# Patient Record
Sex: FEMALE | Race: WHITE
[De-identification: names, ages, dates, MRNs, and addresses within clinical notes are randomized per-mention and may not be internally consistent; named-entity substitution may affect disease eponyms.]

---

## 2017-05-08 ENCOUNTER — HOSPITAL ENCOUNTER (OUTPATIENT)
Dept: HOSPITAL 35 - PAIN | Age: 67
End: 2017-05-08
Attending: ANESTHESIOLOGY
Payer: COMMERCIAL

## 2017-05-08 VITALS — BODY MASS INDEX: 49.96 KG/M2 | HEIGHT: 59.02 IN | WEIGHT: 247.8 LBS

## 2017-05-08 VITALS — DIASTOLIC BLOOD PRESSURE: 60 MMHG | SYSTOLIC BLOOD PRESSURE: 149 MMHG

## 2017-05-08 DIAGNOSIS — I10: ICD-10-CM

## 2017-05-08 DIAGNOSIS — F41.8: ICD-10-CM

## 2017-05-08 DIAGNOSIS — G47.30: ICD-10-CM

## 2017-05-08 DIAGNOSIS — M17.0: Primary | ICD-10-CM

## 2017-05-08 DIAGNOSIS — E66.01: ICD-10-CM

## 2017-05-08 DIAGNOSIS — G89.4: ICD-10-CM

## 2017-05-08 DIAGNOSIS — Z96.652: ICD-10-CM

## 2018-01-29 ENCOUNTER — HOSPITAL ENCOUNTER (OUTPATIENT)
Dept: HOSPITAL 35 - PAIN | Age: 68
End: 2018-01-29
Attending: ANESTHESIOLOGY
Payer: COMMERCIAL

## 2018-01-29 VITALS — DIASTOLIC BLOOD PRESSURE: 63 MMHG | SYSTOLIC BLOOD PRESSURE: 166 MMHG

## 2018-01-29 DIAGNOSIS — G90.522: ICD-10-CM

## 2018-01-29 DIAGNOSIS — E66.01: ICD-10-CM

## 2018-01-29 DIAGNOSIS — F32.9: ICD-10-CM

## 2018-01-29 DIAGNOSIS — Z79.899: ICD-10-CM

## 2018-01-29 DIAGNOSIS — F41.9: ICD-10-CM

## 2018-01-29 DIAGNOSIS — M17.11: Primary | ICD-10-CM

## 2018-04-05 ENCOUNTER — HOSPITAL ENCOUNTER (OUTPATIENT)
Dept: HOSPITAL 35 - PAIN | Age: 68
End: 2018-04-05
Attending: ANESTHESIOLOGY
Payer: COMMERCIAL

## 2018-04-05 VITALS — SYSTOLIC BLOOD PRESSURE: 143 MMHG | DIASTOLIC BLOOD PRESSURE: 67 MMHG

## 2018-04-05 VITALS — WEIGHT: 245 LBS | HEIGHT: 60 IN | BODY MASS INDEX: 48.1 KG/M2

## 2018-04-05 DIAGNOSIS — F32.9: ICD-10-CM

## 2018-04-05 DIAGNOSIS — G90.522: ICD-10-CM

## 2018-04-05 DIAGNOSIS — E66.01: ICD-10-CM

## 2018-04-05 DIAGNOSIS — M17.11: Primary | ICD-10-CM

## 2018-04-05 DIAGNOSIS — Z79.899: ICD-10-CM

## 2018-04-05 DIAGNOSIS — F41.9: ICD-10-CM

## 2018-08-20 ENCOUNTER — HOSPITAL ENCOUNTER (OUTPATIENT)
Dept: HOSPITAL 35 - PAIN | Age: 68
End: 2018-08-20
Attending: ANESTHESIOLOGY
Payer: COMMERCIAL

## 2018-08-20 VITALS — SYSTOLIC BLOOD PRESSURE: 149 MMHG | DIASTOLIC BLOOD PRESSURE: 65 MMHG

## 2018-08-20 VITALS — BODY MASS INDEX: 45 KG/M2 | WEIGHT: 229.2 LBS | HEIGHT: 60 IN

## 2018-08-20 DIAGNOSIS — M17.11: ICD-10-CM

## 2018-08-20 DIAGNOSIS — F32.9: ICD-10-CM

## 2018-08-20 DIAGNOSIS — G90.522: Primary | ICD-10-CM

## 2018-08-20 DIAGNOSIS — F41.9: ICD-10-CM

## 2018-08-20 DIAGNOSIS — Z79.899: ICD-10-CM

## 2018-08-20 DIAGNOSIS — E66.01: ICD-10-CM

## 2018-11-15 ENCOUNTER — HOSPITAL ENCOUNTER (OUTPATIENT)
Dept: HOSPITAL 35 - PAIN | Age: 68
End: 2018-11-15
Attending: CLINICAL NURSE SPECIALIST
Payer: COMMERCIAL

## 2018-11-15 VITALS — SYSTOLIC BLOOD PRESSURE: 150 MMHG | DIASTOLIC BLOOD PRESSURE: 71 MMHG

## 2018-11-15 VITALS — WEIGHT: 226 LBS | HEIGHT: 60 IN | BODY MASS INDEX: 44.37 KG/M2

## 2018-11-15 DIAGNOSIS — M25.552: ICD-10-CM

## 2018-11-15 DIAGNOSIS — M25.562: Primary | ICD-10-CM

## 2018-11-15 DIAGNOSIS — M54.5: ICD-10-CM

## 2018-11-15 DIAGNOSIS — M25.551: ICD-10-CM

## 2018-11-15 DIAGNOSIS — M79.652: ICD-10-CM

## 2018-12-07 ENCOUNTER — HOSPITAL ENCOUNTER (EMERGENCY)
Dept: HOSPITAL 35 - ER | Age: 68
Discharge: HOME | End: 2018-12-07
Payer: COMMERCIAL

## 2018-12-07 VITALS — BODY MASS INDEX: 47.12 KG/M2 | WEIGHT: 240 LBS | HEIGHT: 60 IN

## 2018-12-07 VITALS — SYSTOLIC BLOOD PRESSURE: 174 MMHG | DIASTOLIC BLOOD PRESSURE: 76 MMHG

## 2018-12-07 DIAGNOSIS — Z88.4: ICD-10-CM

## 2018-12-07 DIAGNOSIS — G89.29: ICD-10-CM

## 2018-12-07 DIAGNOSIS — E78.00: ICD-10-CM

## 2018-12-07 DIAGNOSIS — Z88.5: ICD-10-CM

## 2018-12-07 DIAGNOSIS — E11.9: ICD-10-CM

## 2018-12-07 DIAGNOSIS — I10: ICD-10-CM

## 2018-12-07 DIAGNOSIS — F32.9: ICD-10-CM

## 2018-12-07 DIAGNOSIS — M19.90: ICD-10-CM

## 2018-12-07 DIAGNOSIS — Z96.653: ICD-10-CM

## 2018-12-07 DIAGNOSIS — M79.605: Primary | ICD-10-CM

## 2019-02-27 NOTE — NUR
4Pain Clinic Assessment:
 
1. History of Osteoarthritis:
yes
   History of Rheumatoid Arthritis:
Not Applicable
 
2. Height:  ft.  in.  cm.
   Weight:  lb.  oz.  kg.
   Patient's BMI:
 
3. Vital Signs:
   BP:  Pulse:  Resp:
   Temp:  02 Sat:  ECG Mon:
 
4. Pain Intensity: 8
 
5. Fall Risk:
   Dizziness:   Needs help standing or walking:
   Fallen in the last 3 months:
   Fall risk comments:
 
 
6. Patient on Blood Thinner: None
 
7. History of Hypertension: Y
 
8. Opioid Therapy greater than 6 weeks: Y
   Opiate Contract Signed:
 
9. Risk Assessment Tool Provided: LOW
 
10. Functional Assessment Tool: 57/70
 
11. Recreational Drug Use: Never Drug Type:
    Tobacco Use: Never Smoker Tobacco Type:
       Amount or Packs/day:  How Many Years:
    Alcohol Use: No  Frequency:  Quant:

## 2019-02-28 ENCOUNTER — HOSPITAL ENCOUNTER (OUTPATIENT)
Dept: HOSPITAL 35 - PAIN | Age: 69
End: 2019-02-28
Attending: CLINICAL NURSE SPECIALIST
Payer: COMMERCIAL

## 2019-02-28 VITALS — DIASTOLIC BLOOD PRESSURE: 57 MMHG | SYSTOLIC BLOOD PRESSURE: 153 MMHG

## 2019-02-28 VITALS — WEIGHT: 254.8 LBS | HEIGHT: 60 IN | BODY MASS INDEX: 50.03 KG/M2

## 2019-02-28 DIAGNOSIS — F32.9: ICD-10-CM

## 2019-02-28 DIAGNOSIS — Z79.899: ICD-10-CM

## 2019-02-28 DIAGNOSIS — G90.522: ICD-10-CM

## 2019-02-28 DIAGNOSIS — E66.01: ICD-10-CM

## 2019-02-28 DIAGNOSIS — Z79.891: ICD-10-CM

## 2019-02-28 DIAGNOSIS — M17.0: Primary | ICD-10-CM

## 2019-02-28 DIAGNOSIS — F41.9: ICD-10-CM

## 2019-02-28 NOTE — NUR
Pain Clinic Assessment:
 
1. History of Osteoarthritis:
yes
   History of Rheumatoid Arthritis:
Not Applicable
 
2. Height: 5 ft. 0 in. 152.4 cm.
   Weight: 254.8 lb.  oz. 115.577 kg.
   Patient's BMI: 49.8
 
3. Vital Signs:
   BP: 153/57 Pulse: 60 Resp: 16
   Temp:  02 Sat: 97 ECG Mon:
 
4. Pain Intensity: 8
 
5. Fall Risk:
   Dizziness: N  Needs help standing or walking: Y
   Fallen in the last 3 months: N
   Fall risk comments:
 
 
6. Patient on Blood Thinner: None
 
7. History of Hypertension: Y
 
8. Opioid Therapy greater than 6 weeks: Y
   Opiate Contract Signed:
 
9. Risk Assessment Tool Provided: LOW
 
10. Functional Assessment Tool: 57/70
 
11. Recreational Drug Use: Never Drug Type:
    Tobacco Use: Never Smoker Tobacco Type:
       Amount or Packs/day:  How Many Years:
    Alcohol Use: No  Frequency:  Quant:

## 2019-06-13 ENCOUNTER — HOSPITAL ENCOUNTER (OUTPATIENT)
Dept: HOSPITAL 35 - PAIN | Age: 69
End: 2019-06-13
Attending: CLINICAL NURSE SPECIALIST
Payer: COMMERCIAL

## 2019-06-13 VITALS — DIASTOLIC BLOOD PRESSURE: 56 MMHG | SYSTOLIC BLOOD PRESSURE: 129 MMHG

## 2019-06-13 VITALS — WEIGHT: 250 LBS | BODY MASS INDEX: 49.08 KG/M2 | HEIGHT: 60 IN

## 2019-06-13 DIAGNOSIS — E66.01: ICD-10-CM

## 2019-06-13 DIAGNOSIS — F41.9: ICD-10-CM

## 2019-06-13 DIAGNOSIS — F32.9: ICD-10-CM

## 2019-06-13 DIAGNOSIS — M17.0: Primary | ICD-10-CM

## 2019-06-13 NOTE — NUR
Pain Clinic Assessment:
 
1. History of Osteoarthritis:
yes
   History of Rheumatoid Arthritis:
Not Applicable
 
2. Height: 5 ft. 0 in. 152.4 cm.
   Weight: 250.0 lb.  oz. 113.400 kg.
   Patient's BMI: 48.8
 
3. Vital Signs:
   BP: 129/56 Pulse: 58 Resp: 16
   Temp:  02 Sat: 99 ECG Mon:
 
4. Pain Intensity: 8 with walking
 
5. Fall Risk:
   Dizziness: N  Needs help standing or walking: Y
   Fallen in the last 3 months: N
   Fall risk comments:
 
 
6. Patient on Blood Thinner: None
 
7. History of Hypertension: Y
 
8. Opioid Therapy greater than 6 weeks: Y
   Opiate Contract Signed:
 
9. Risk Assessment Tool Provided: LOW
 
10. Functional Assessment Tool: 57/70
 
11. Recreational Drug Use: Never Drug Type:
    Tobacco Use: Never Smoker Tobacco Type:
       Amount or Packs/day:  How Many Years:
    Alcohol Use: No  Frequency:  Quant:

## 2019-08-15 ENCOUNTER — HOSPITAL ENCOUNTER (OUTPATIENT)
Dept: HOSPITAL 35 - PAIN | Age: 69
End: 2019-08-15
Attending: ANESTHESIOLOGY
Payer: COMMERCIAL

## 2019-08-15 VITALS — DIASTOLIC BLOOD PRESSURE: 66 MMHG | SYSTOLIC BLOOD PRESSURE: 141 MMHG

## 2019-08-15 VITALS — HEIGHT: 60 IN | WEIGHT: 247 LBS | BODY MASS INDEX: 48.49 KG/M2

## 2019-08-15 DIAGNOSIS — E66.01: ICD-10-CM

## 2019-08-15 DIAGNOSIS — F41.9: ICD-10-CM

## 2019-08-15 DIAGNOSIS — G90.522: ICD-10-CM

## 2019-08-15 DIAGNOSIS — Z79.891: ICD-10-CM

## 2019-08-15 DIAGNOSIS — F32.9: ICD-10-CM

## 2019-08-15 DIAGNOSIS — M17.12: Primary | ICD-10-CM

## 2019-08-15 NOTE — NUR
Pain Clinic Assessment:
 
1. History of Osteoarthritis:
yes
   History of Rheumatoid Arthritis:
Not Applicable
 
2. Height: 5 ft. 0 in. 152.4 cm.
   Weight: 247.0 lb.  oz. 112.039 kg.
   Patient's BMI: 48.2
 
3. Vital Signs:
   BP: 141/66 Pulse: 57 Resp: 16
   Temp:  02 Sat: 97 ECG Mon:
 
4. Pain Intensity: 8
 
5. Fall Risk:
   Dizziness: N  Needs help standing or walking: Y
   Fallen in the last 3 months: N
   Fall risk comments:
 
 
6. Patient on Blood Thinner: None
 
7. History of Hypertension: Y
 
8. Opioid Therapy greater than 6 weeks: Y
   Opiate Contract Signed:
 
9. Risk Assessment Tool Provided: LOW
 
10. Functional Assessment Tool: 57/70
 
11. Recreational Drug Use: Never Drug Type:
    Tobacco Use: Never Smoker Tobacco Type:
       Amount or Packs/day:  How Many Years:
    Alcohol Use: No  Frequency:  Quant:

## 2019-08-15 NOTE — HPC
[facility]
[address]
[place]   [address]                     PAIN MANAGEMENT CONSULTATION  
_______________________________________________________________________________
 
Name:       YUNIER TAVAREZ             Room #:                     REG ABIGAIL 
GERARDO.#:      [id number]                       Account #:      [id number]  
Admission:  08/15/19    Attend Phys:    Vineet Shipley MD 
Discharge:              Date of Birth:  [birth year]  
                                                          Report #: [id number]
                                                                    [id number]   
_______________________________________________________________________________
THIS REPORT FOR:   //name//                          
 
CC: Shantell Shipley
 
DATE OF SERVICE:  08/15/2019
 
 
Followup visit for chronic left knee pain.
 
The patient returns to pain clinic for followup for medication management.  I
provide her with Butrans patches.  She also received Celebrex which has been
somewhat helpful in managing her pain.
 
We had some discussion today about cooled radiofrequency.  I think that she
would be a difficult case due to the significant structural abnormalities that
exist within her knee joint.
 
PQRS REVIEW:
1.  History of osteoarthritis, diffuse, particularly involving the knees.
2.  BMI of 48.2.  Counseling regarding weight loss.
3.  Blood pressure 141/66, heart rate 57, respirations 16.
4.  Pain intensity 8/10.
5.  She is a fall risk but has not fallen in the last 3 months.  She needs help
standing and walking.
6.  She is on no blood thinners.
7.  History of hypertension, which is currently under treatment.  All of her
medications were reviewed and reconciled from the electronic medical record and
are noted there.
8.  She is on an opioid agreement and we reviewed the important aspects of that.
 She is grateful for the pain relief that her pain medications provide and
improvement in her day-to-day activities.  She denies significant side effects. 
She carefully safeguards all medication.
9.  She is at low risk for addiction as scored by the opioid risk tool.
10.  Functional assessment score is 57 suggesting high impact of her pain.
11.  She denies use of tobacco and alcohol.
 
PHYSICAL EXAMINATION:
VITAL SIGNS:  As noted.
GENERAL:  She is pleasant, alert and oriented, without signs of depression or
anxiety.
CHEST:  Examination of the chest is clear.
CARDIAC:  Rhythm is regular.
EXTREMITIES:  Examination of the left leg reveals edematous lower extremity. 
Midline scar over the knee.  Localized tenderness and significant restrictions
in range of motion in flexion.  There is tenderness along the lateral thigh and
 
 
 
[facility]
[address]
[place]   [address]                     PAIN MANAGEMENT CONSULTATION  
_______________________________________________________________________________
 
Name:       YUNIER TAVAREZ             Room #:                     REG [facility].#:      [id number]                       Account #:      [id number]  
Admission:  08/15/19    Attend Phys:    Vineet Shipley MD 
Discharge:              Date of Birth:  [birth year]  
                                                          Report #: [id number]
                                                                    [id number]   
_______________________________________________________________________________
also down into the calf.  There is some discoloration and bruising.
 
IMPRESSION:
1.  Chronic intractable pain.
2.  Severe osteoarthritis.
3.  Complex regional pain syndrome, left lower extremity.
4.  Depression and anxiety, improved.
5.  Morbid obesity.
6.  Management of opioid medications under terms of written opioid agreement.
 
I renewed her Butrans 10 mg and Celebrex 200 mg for the next 3 months.
 
Follow up as needed.
 
 
 
 
 
 
 
 
 
 
 
 
 
 
 
 
 
 
 
 
 
 
 
 
 
 
 
 
 
 
 
                         
   By:                               
                   
D: 08/15/19 1741                           _____________________________________
T: 08/16/19 0236                           MD eusebia Hill

## 2019-12-16 ENCOUNTER — HOSPITAL ENCOUNTER (OUTPATIENT)
Dept: HOSPITAL 35 - PAIN | Age: 69
End: 2019-12-16
Attending: CLINICAL NURSE SPECIALIST
Payer: COMMERCIAL

## 2019-12-16 VITALS — BODY MASS INDEX: 48.1 KG/M2 | WEIGHT: 245 LBS | HEIGHT: 60 IN

## 2019-12-16 VITALS — DIASTOLIC BLOOD PRESSURE: 47 MMHG | SYSTOLIC BLOOD PRESSURE: 130 MMHG

## 2019-12-16 DIAGNOSIS — G89.4: Primary | ICD-10-CM

## 2019-12-16 DIAGNOSIS — E66.09: ICD-10-CM

## 2019-12-16 DIAGNOSIS — M19.90: ICD-10-CM

## 2019-12-16 DIAGNOSIS — Z79.891: ICD-10-CM

## 2019-12-16 NOTE — NUR
Pain Clinic Assessment:
 
1. History of Osteoarthritis:
yes
   History of Rheumatoid Arthritis:
Not Applicable
 
2. Height: 5 ft. 0 in. 152.4 cm.
   Weight: 245.0 lb.  oz. 111.132 kg.
   Patient's BMI: 47.8
 
3. Vital Signs:
   BP: 130/47 Pulse: 71 Resp: 20
   Temp:  02 Sat: 95 ECG Mon:
 
4. Pain Intensity: 7-8
 
5. Fall Risk:
   Dizziness: N  Needs help standing or walking: N
   Fallen in the last 3 months: N
   Fall risk comments:
 
 
6. Patient on Blood Thinner: None
 
7. History of Hypertension: Y
 
8. Opioid Therapy greater than 6 weeks: Y
   Opiate Contract Signed:
 
9. Risk Assessment Tool Provided: LOW
 
10. Functional Assessment Tool: 57/70
 
11. Recreational Drug Use: Never Drug Type:
    Tobacco Use: Never Smoker Tobacco Type:
       Amount or Packs/day:  How Many Years:
    Alcohol Use: No  Frequency:  Quant:

## 2019-12-17 NOTE — HPC
[facility]
[place]   [address]                     PAIN MANAGEMENT CONSULTATION  
_______________________________________________________________________________
 
Name:       YUNIER TAVAREZ             [facility]                     [facility]      [id number]                       Account #:      [id number]  
Admission:  12/16/19    Attend Phys:    Debbi Chaney     
Discharge:              Date of Birth:  [birth year]  
                                                          Report #: [id number]
                                                                    [id number]   
_______________________________________________________________________________
THIS REPORT FOR:   //name//                          
 
CC: Shantell Shipley MD
 
DATE OF SERVICE:  12/16/2019
 
 
CHIEF COMPLAINT:  Followup visit.  Chronic left knee pain.
 
HISTORY OF PRESENT ILLNESS:  This is a 69-year-old female who returns to the
pain clinic today for medication refill that she takes for her ongoing left knee
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  She also complains of some ongoing low
back pain, but her biggest complaint today is right leg pain.  She reports that
she has had cellulitis in this leg for about 6 weeks.  Her pain is an aching,
throbbing soreness, rating her pain at 7-8.  She reports that her pain started
in her right foot with a sore, rubbing from a new shoe and then her pain did
travel up to her knee.  She has been on antibiotics for about 6 weeks.  She is
scheduled to see the Infectious Disease doctor on 12/26/2019 because her right
leg cellulitis is not improving.
 
The patient reports her pain score is 7-8 today, worse with walking and any
activity as well as standing.  She feels that she repositions herself and uses
her medication.  Her left knee pain is significantly better, but nothing is
helping her right leg.  She also reports that she has recently had a sinus
infection and has an ongoing cough.  She is seeing her primary care doctor for
these issues.
 
ALLERGIES:  MORPHINE, CODEINE, FENTANYL, AND PROCAINE.
 
CURRENT LIST OF MEDICATIONS:  Augmentin, Celebrex, Butrans 10 mcg patch,
Lexapro, buspirone, vitamin D, Senna, metformin, minocycline, glyburide,
multivitamin, Plendil, Zocor, Benicar and omeprazole.
 
PQRS:
1.  She has diffuse osteoarthritis involving both her bilateral knees.  She
denies any rheumatoid arthritis.
2.  Height is 5 feet, weight is 245, BMI is 47.
3.  Vital signs 130/47, pulse is 71, respirations 20, oxygen sat is 95.
4.  Pain score is 7-8.
5.  Denies dizziness.  Does need help walking.  She is in a wheelchair presently
today.  She has not fallen in the last 3 months.
6.  The patient is not on any blood thinners, but does take medicine for
hypertension.  Her opioid therapy is greater than 6 weeks; therefore, an opioid
 
 
 
[address]   [address]                     PAIN MANAGEMENT CONSULTATION  
_______________________________________________________________________________
 
Name:       YUNIER TAVAREZ             Room #:                     REG CL 
M.DARCY.#:      [id number]                       Account #:      [id number]  
Admission:  12/16/19    Attend Phys:    Debbi Chaney     
Discharge:              Date of Birth:  [birth year]  
                                                          Report #: [id number]
                                                                    [id number]   
_______________________________________________________________________________
signed contract is on the chart.  Risk assessment tool is low.  Functional
assessment is 57/70.
7.  Recreational drug use, she denies.  She is not a smoker and does not drink
alcohol.
 
According to the prescription monitoring system, the patient is filling
appropriately for her Butrans patch at a mail-off pharmacy.  She does safeguard
these meds at all times per her report.
 
PHYSICAL EXAMINATION:
GENERAL:  This is alert and orientated 69-year-old female who appears slightly
older than her stated age.  She is without signs of depression or anxiety.
HEENT:  Normocephalic, atraumatic.  Extraocular eye muscles are intact.  She is
congested today.
LUNGS:  Diminished to auscultation.  She has a productive cough.
EXTREMITIES:  Examination of the left leg reveals edematous lower extremities
with scarring over the midline of her knee.  Her right leg from the knee
downward is reddened and tender to the touch and does appear edematous as well. 
The patient has increased pain with flexion and extension of both knees and
increased pain with standing.
 
IMPRESSION:
1.  Chronic intractable pain.
2.  Severe osteoarthritis.
3.  Morbid obesity.
4.  Recent diagnosis of cellulitis.
5.  Complex regional pain syndrome, left lower extremity.
6.  Management of opioid medications under terms of written opioid agreement.
 
PLAN:
1.  We discussed treatment options with the patient today.  The patient finds
the medication helpful in reducing some of her left knee pain with minimal side
effects such as constipation or daytime sleepiness.  She would like a refill of
her Butrans today.  We will fax off Butrans 10 mcg patch to the [facility] where
she fills her prescriptions.
2.  I encouraged the patient to call the Infectious Disease doctor to see if
they have any cancellations so she may try to get in sooner for an appointment
than 12/26/2019.  If they do not, I would also encourage her to call her primary
care doctor since her cellulitis is not improving despite 3 rounds of antibiotic
plus her primary care doctor can treat her ongoing sinus infection and cough. 
The patient verbalized understanding.  She will call them after discharge.
 
 
 
[address]   [address]                     PAIN MANAGEMENT CONSULTATION  
_______________________________________________________________________________
 
Name:       YUNIER TAVAREZ             Room #:                     REG CLI 
FREDDYSherlynLILIBETH#:      [id number]                       Account #:      [id number]  
Admission:  12/16/19    Attend Phys:    Debbi Chaney     
Discharge:              Date of Birth:  [birth year]  
                                                          Report #: [id number]
                                                                    [id number]   
_______________________________________________________________________________
3.  The patient will follow up in 3 months.  The patient is seen in
collaboration with Dr. Vineet Shipley today.
 
 
 
 
 
 
 
 
 
 
 
 
 
 
 
 
 
 
 
 
 
 
 
 
 
 
 
 
 
 
 
 
 
 
 
 
 
 
 
 
 
 
  <ELECTRONICALLY SIGNED>
   By: Debbi Chaney             
  12/17/19     0806
D: 12/16/19 1336                           _____________________________________
T: 12/17/19 0011                           Debbi Chaney               /nt

## 2020-03-19 ENCOUNTER — HOSPITAL ENCOUNTER (OUTPATIENT)
Dept: HOSPITAL 35 - PAIN | Age: 70
End: 2020-03-19
Attending: CLINICAL NURSE SPECIALIST
Payer: COMMERCIAL

## 2020-03-19 VITALS — BODY MASS INDEX: 47.94 KG/M2 | HEIGHT: 60 IN | WEIGHT: 244.2 LBS

## 2020-03-19 VITALS — SYSTOLIC BLOOD PRESSURE: 163 MMHG | DIASTOLIC BLOOD PRESSURE: 63 MMHG

## 2020-03-19 DIAGNOSIS — M17.12: Primary | ICD-10-CM

## 2020-03-19 DIAGNOSIS — G89.29: ICD-10-CM

## 2020-03-19 DIAGNOSIS — Z79.899: ICD-10-CM

## 2020-03-19 DIAGNOSIS — Z79.891: ICD-10-CM

## 2020-03-19 NOTE — NUR
Pain Clinic Assessment:
 
1. History of Osteoarthritis:
yes
   History of Rheumatoid Arthritis:
Not Applicable
 
2. Height: 5 ft. 0 in. 152.4 cm.
   Weight: 244.2 lb.  oz. 110.769 kg.
   Patient's BMI: 47.7
 
3. Vital Signs:
   BP: 163/63 Pulse: 63 Resp: 18
   Temp:  02 Sat: 98 ECG Mon:
 
4. Pain Intensity: 7-8
 
5. Fall Risk:
   Dizziness: Y  Needs help standing or walking: Y
   Fallen in the last 3 months: N
   Fall risk comments:
 
 
6. Patient on Blood Thinner: None
 
7. History of Hypertension: Y
 
8. Opioid Therapy greater than 6 weeks: Y
   Opiate Contract Signed:
 
9. Risk Assessment Tool Provided: LOW
 
10. Functional Assessment Tool: 57/70
 
11. Recreational Drug Use: Never Drug Type:
    Tobacco Use: Never Smoker Tobacco Type:
       Amount or Packs/day:  How Many Years:
    Alcohol Use: No  Frequency:  Quant:

## 2020-03-20 NOTE — HPC
[facility]
[address]
[place]   [address]                     PAIN MANAGEMENT CONSULTATION  
_______________________________________________________________________________
 
Name:       YUNIER TAVAREZ             Room #:                     REG [facility].#:      [id number]                       Account #:      [id number]  
Admission:  03/19/20    Attend Phys:    Debbi Chaney     
Discharge:              Date of Birth:  [birth year]  
                                                          Report #: [id number]
                                                                    [id number]   
_______________________________________________________________________________
THIS REPORT FOR:  
 
cc:  Shantell Leger MD, Aimee B. MD Hocker,Debbi ROBERT                                            ~
 
DATE OF SERVICE:  03/19/2020
 
 
CHIEF COMPLAINT:  Chronic left knee pain, low back pain.
 
HISTORY OF PRESENT ILLNESS:  This is a 70-year-old female who returns to the
pain clinic today for refill of her medications that she uses to help her
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  She also does have some
low back pain.  Today, she is reporting her pain score is 7-8.  It is an aching,
throbbing soreness that is worse with any activity, walking and weather changes.
 She feels that repositioning is beneficial as well as her Butrans patch.  She
denies any problems with constipation or daytime sleepiness.
 
Today, the patient is also reporting that she is having some lower leg pain,
some swelling in her right extremity.  She always has edema present in her left.
 She states she did see an Infectious Disease doctor and was diagnosed with
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
 
ALLERGIES:  MORPHINE, CODEINE, FENTANYL, AND PROCAINE.
 
CURRENT LIST OF MEDICATIONS:  Doxycycline, Prozac, Butrans 10 mcg patch,
Augmentin, Celebrex, buspirone, vitamin D, senna, metformin, glyburide, Extra
Strength Tylenol, multivitamin, Plendil, Simvastatin, Benicar and omeprazole.
 
PATIENT'S PQRS:
1.  She has diffuse osteoarthritis in her lower extremities.  Denies any
rheumatoid arthritis.
2.  Height is 5 feet, weight is 244, BMI is 47.
3.  Vital signs 163/63, pulse is 63, respirations 18, oxygen sat is 98.
4.  Pain score is 7-8.
5.  Complains of dizziness, does need assistance walking.  She is mostly in a
wheelchair, has not fallen in the last 3 months.
6.  The patient is not on any blood thinners, but does take medicine for
hypertension.
7.  Opioid therapy is greater than 6 weeks; therefore, an opioid signed contract
is on the chart.  Risk assessment tool is low.  Functional assessment is 57/70.
8.  Recreational drug use, she denies.  She is not a smoker and does not drink
alcohol.
 
According to the prescription monitoring system, she obtains her medicine from a
 
 
 
[address]   [address]                     PAIN MANAGEMENT CONSULTATION  
_______________________________________________________________________________
 
Name:       YUNIER TAVAREZ             Room #:                     REG [facility] 
ECTOR#:      [id number]                       Account #:      [id number]  
Admission:  03/19/20    Attend Phys:    Debbi Chaney     
Discharge:              Date of Birth:  [birth year]  
                                                          Report #: [id number]
                                                                    [id number]   
_______________________________________________________________________________
mail off pharmacy every 3 months, filling them appropriately.  She is well under
the 50 MME guidelines.  She does safeguard her meds at all times.
 
PHYSICAL EXAMINATION:
GENERAL:  This is alert and orientated, morbidly obese 70-year-old female who
appears her stated age without signs of depression or anxiety or overmedication.
HEENT:  Normocephalic, atraumatic.  Extraocular eye muscles are intact.  Mucous
membranes are moist.
LUNGS:  Diminished.
EXTREMITIES:  Her left leg reveals edematous lower extremity with scarring over
her left knee, edema at 3+.  Right leg is tender to the touch with 1+ edema,
slightly reddened.  Pain is increased with standing and weightbearing of her
left knee.
 
IMPRESSION:
1.  Chronic intractable pain.
2.  Severe osteoarthritis.
3.  Morbid obesity.
4.  Cellulitis.
5.  Complex regional pain syndrome of the left lower extremity.
6.  Management of opioid medications under written opioid agreement.
 
We reviewed the fact that opiate medications are being used to provide analgesia
adequate to support activities of daily living, not attempting to achieve a
specific pain score on the 0-10 Visual Analog Scale.  The current opiate
medications are providing sufficient analgesia to allow the patient to
participate in activities of daily living.  The patient is not exhibiting any
aberrant behavior suggestive of drug diversion.  The patient is not having any
adverse reactions to medications.  The patient is not suffering from daytime
somnolence or mental acuity changes.  The patient is managing opiate-induced
constipation with appropriate over-the-counter agents and dietary
considerations.  The patient was counseled on concern for caution with operating
a motor vehicle while using opiate medications.
 
PLAN:
1.  We discussed treatment options with the patient today.  The patient reports
that she is having some increased swelling in her right lower extremity.  Upon
examination, it does look like cellulitis.  The patient had seen an Infectious
Disease doctor in the past.  She had a diagnosis of cellulitis.  I encouraged
her to keep her leg elevated above the heart to decrease some of her edema and
if symptoms continue to worsen to contact her Infectious Disease doctor.
2.  The patient does report having some increased pain.  I encouraged her to
utilize her Butrans patch and Celebrex on a daily basis.  The patient finds the
Butrans very beneficial at 10 mcg per day.  We did discuss possible treatment in
the future when the medication of tanezumab comes on the market.  This is a
medicine for osteoarthritis and she may find this beneficial.  The patient would
 
 
 
[address]   [address]                     PAIN MANAGEMENT CONSULTATION  
_______________________________________________________________________________
 
Name:       YUNIER TAVAREZ             Room #:                     REG ABIGAIL BARNEY#:      [id number]                       Account #:      [id number]  
Admission:  03/19/20    Attend Phys:    Debbi Chaney     
Discharge:              Date of Birth:  [birth year]  
                                                          Report #: [id number]
                                                                    [id number]   
_______________________________________________________________________________
like to be able to decrease her pain slightly, but note she did not tolerate
increases in her Butrans patch strength.
3.  We will have Dr. Vineet Shipley, send her medications to [facility] for
refills for a 3-month supply.  The patient does not need Celebrex refills today.
4.  The patient is seen in collaboration today with Dr. Vineet Shipley.
 
 
 
 
 
 
 
 
 
 
 
 
 
 
 
 
 
 
 
 
 
 
 
 
 
 
 
 
 
 
 
 
 
 
 
 
 
 
 
  <ELECTRONICALLY SIGNED>
   By: Debbi Chaney             
  03/20/20     0754
D: 03/19/20 1348                           _____________________________________
T: 03/19/20 1427                           Debbi Chaney               /nt

## 2020-06-22 ENCOUNTER — HOSPITAL ENCOUNTER (OUTPATIENT)
Dept: HOSPITAL 35 - PAIN | Age: 70
End: 2020-06-22
Attending: ANESTHESIOLOGY
Payer: COMMERCIAL

## 2020-06-22 VITALS — BODY MASS INDEX: 46.23 KG/M2 | HEIGHT: 60 IN | WEIGHT: 235.5 LBS

## 2020-06-22 VITALS — DIASTOLIC BLOOD PRESSURE: 53 MMHG | SYSTOLIC BLOOD PRESSURE: 132 MMHG

## 2020-06-22 DIAGNOSIS — G89.29: Primary | ICD-10-CM

## 2020-06-22 NOTE — NUR
Pain Clinic Assessment:
 
1. History of Osteoarthritis:
yes
   History of Rheumatoid Arthritis:
Not Applicable
 
2. Height: 5 ft. 0 in. 152.4 cm.
   Weight: 235.5 lb.  oz. 106.822 kg.
   Patient's BMI: 46.0
 
3. Vital Signs:
   BP: 132/53 Pulse: 66 Resp: 20
   Temp:  02 Sat: 99 ECG Mon:
 
4. Pain Intensity: 10
 
5. Fall Risk:
   Dizziness: N  Needs help standing or walking: Y
   Fallen in the last 3 months: N
   Fall risk comments:
 
 
6. Patient on Blood Thinner: None
 
7. History of Hypertension: Y
 
8. Opioid Therapy greater than 6 weeks: Y
   Opiate Contract Signed:
 
9. Risk Assessment Tool Provided: LOW
 
10. Functional Assessment Tool: 57/70
 
11. Recreational Drug Use: Never Drug Type:
    Tobacco Use: Never Smoker Tobacco Type:
       Amount or Packs/day:  How Many Years:
    Alcohol Use: No  Frequency:  Quant:

## 2020-06-26 NOTE — HPC
[facility]
[address]
[place]   [address]                     PAIN MANAGEMENT CONSULTATION  
_______________________________________________________________________________
 
Name:       YUNIER TAVAREZ             Room #:                     REG ABIGAIL 
MARYDARCY.#:      [id number]                       Account #:      [id number]  
Admission:  06/22/20    Attend Phys:    Vineet Shipley MD 
Discharge:              Date of Birth:  [birth year]  
                                                          Report #: [id number]
                                                                    [id number]   
_______________________________________________________________________________
THIS REPORT FOR:  
 
cc:  Shantell Leger MD,Shantell Shipley,Vineet ARAGON MD                                         ~
CC: Shantell Shipley
 
DATE OF SERVICE:  06/22/2020
 
 
45-minute consultation for chronic pain.
 
The patient was in pain clinic today for a lengthy discussion about her
intractable left knee pain.  Pain has been so severe that she has been suicidal
at times.  She was worse when she was on Prozac and has discontinued it.  Since
her last visit here, she has seen multiple consultants and we reviewed the
opinions of each one of them.  She first saw Dr. Johnson who reported that
there was no operation that he could perform except perhaps an above-knee
amputation.  She then saw Dr. Garcia who also felt that there was not a surgical
orthopedic operation to help.  Amputation was mentioned during that visit as
well.  I should make it clear that neither one of these doctors felt that that
was a particularly good option as I will discuss below-knee amputation certainly
may not eliminate her pain.
 
Dr. Johnson suggested that she see Dr. Wick "cryo-neurologist" possibly for
genicular nerve blocks.  He did not feel that this would be helpful and
performed no procedures.  She was then seen by Dr. Hector, a rehabilitation
specialist.  He did not feel that a prosthesis could be arranged if she had an
amputation, but they did not discuss much further.  He did send her to 
[facility].  He had another consultation where they discussed prostheses, but
were pessimistic.
 
She has continued to use medication and is grateful for Butrans which provides
some measure of relief, but is not adequate.  Without it, she feels that she
would be much worse.  She had tried many other medicines.  We tried to push the
dose from 10 mcg to 15 mcg, but there is a breaking point and she has agitation
side effects at the higher dose and does not want to use a continuous patch at
15.  She is given some consideration to THC, but is living in [place] and it is
not considered a medicine.  I am skeptical that it would provide longterm
benefits and may also worsen her psychological condition, she should be very
cautious.  She tried CBD oil with no relief.  She has some questions about
reinitiating a breakthrough medicine.  We discussed p.o. buprenorphine which
could be used to supplement her Butrans patch since she does tolerate it. 
Hydrocodone has been used in the past and can be considered as a transition of
 
 
 
[facility]
[address] [place]   [address]                     PAIN MANAGEMENT CONSULTATION  
_______________________________________________________________________________
 
Name:       YUNIER TAVAREZ             Room #:                     REG CLSANDRA BARNEY#:      [id number]                       Account #:      [id number]  
Admission:  06/22/20    Attend Phys:    Vineet Shipley MD 
Discharge:              Date of Birth:  [birth year]  
                                                          Report #: [id number]
                                                                    [id number]   
_______________________________________________________________________________
opioid rotation.  I think she should probably go off of buprenorphine if we
decide to go that route to a more pure opioid.  Buprenorphine has opioid agonist
antagonist effects.
 
We discussed the addition of gabapentin.  It surprises me that when she has not
had a trial, I did not look back through all of our records, but I am sure at
some point in time it has been given as a trial co-analgesics.
 
She complains today also very localized tenderness over her left hip.  This is
bursitis and may respond to cortisone injection.  This is a separate pain
generator.  I had a lengthy discussion then ensued regarding central pain.  If
she has an amputation, she may still have phantom limb pain.  She has developed
pathways through the central nervous system and has central pain, I am certain. 
Amputation may remove the offending limb, but I do not think it will eliminate
her pain.  With her obesity, her mobility would be even further limited.
 
Spinal cord stimulation certainly worth a trial.  She has not had a back surgery
and I think that we could safely undergo a trial and she is open to the
possibility she was given some information.
 
We discussed a scooter of some sort to use in her house.  She is, by her own
description, a hoarder so using a scooter in the house may not be ideal, but she
can get around on her feet without excruciating pain.  Either motorized
wheelchair or scooter might be helpful for her, but there is a lot of red tape. 
I am not even sure which type of device to order and I felt that Dr. Hector
of all the physicians that she has seen might be the best in advising her and
understand the paperwork necessary for Medicare to cover a mobility device.  She
may make appointment then to see him again.
 
We will continue her medications under our medication program.  She is on an
opioid agreement for her Butrans.  I am also prescribing her Celebrex, a trial
of gabapentin 100 mg at bedtime, slowly increasing to b.i.d., then t.i.d. will
also be initiated.  We can increase it from there.
 
She was given information regarding spinal cord stimulation process and I will
see her back after she has had a psychology visit to confirm her suitability.
 
Many, many questions were asked and answered.  I also reviewed her x-rays.
 
Total time spent in the clinic was about 2 hours, 45 minutes of which was spent
with me in consultation.
 
 
 
  <ELECTRONICALLY SIGNED>
   By: Vineet Shipley MD         
  06/26/20     1551
D: 06/22/20 1815                           _____________________________________
T: 06/22/20 2033                           Vineet Shipley MD           /nt

## 2020-07-13 ENCOUNTER — HOSPITAL ENCOUNTER (OUTPATIENT)
Dept: HOSPITAL 35 - PAIN | Age: 70
Discharge: HOME | End: 2020-07-13
Attending: ANESTHESIOLOGY
Payer: COMMERCIAL

## 2020-07-13 VITALS — HEIGHT: 60 IN | WEIGHT: 235 LBS | BODY MASS INDEX: 46.13 KG/M2

## 2020-07-13 VITALS — DIASTOLIC BLOOD PRESSURE: 69 MMHG | SYSTOLIC BLOOD PRESSURE: 150 MMHG

## 2020-07-13 DIAGNOSIS — E66.01: ICD-10-CM

## 2020-07-13 DIAGNOSIS — M70.62: Primary | ICD-10-CM

## 2020-07-13 DIAGNOSIS — Z79.899: ICD-10-CM

## 2020-07-13 DIAGNOSIS — G90.522: ICD-10-CM

## 2020-07-13 DIAGNOSIS — I10: ICD-10-CM

## 2020-07-13 DIAGNOSIS — Z98.890: ICD-10-CM

## 2020-07-13 DIAGNOSIS — F32.89: ICD-10-CM

## 2020-07-13 NOTE — NUR
Pain Clinic Assessment:
 
1. History of Osteoarthritis:
HIPS
BACK
   History of Rheumatoid Arthritis:
Not Applicable
 
2. Height: 5 ft. 0 in. 152.4 cm.
   Weight: 235.0 lb.  oz. 106.596 kg.
   Patient's BMI: 45.9
 
3. Vital Signs:
   BP: 150/69 Pulse: 67 Resp: 16
   Temp:  02 Sat: 100 ECG Mon:
 
4. Pain Intensity: 8
 
5. Fall Risk:
   Dizziness: Y  Needs help standing or walking: Y
   Fallen in the last 3 months: N
   Fall risk comments:
 
 
6. Patient on Blood Thinner: None
 
7. History of Hypertension: Y
 
8. Opioid Therapy greater than 6 weeks: Y
   Opiate Contract Signed:
 
9. Risk Assessment Tool Provided: LOW RISK 1/3
 
10. Functional Assessment Tool: 57/70
 
11. Recreational Drug Use: Never Drug Type:
    Tobacco Use: Never Smoker Tobacco Type:
       Amount or Packs/day:  How Many Years:
    Alcohol Use: No  Frequency:  Quant:

## 2020-07-13 NOTE — HPC
[facility]
[address]
[place]   [address]                     PAIN MANAGEMENT CONSULTATION  
_______________________________________________________________________________
 
Name:       YUNIER TAVAREZ             Room #:                     REG ABIGAIL 
GERARDO.#:      [id number]                       Account #:      [id number]  
Admission:  07/13/20    Attend Phys:    Vineet Shipley MD 
Discharge:              Date of Birth:  [birth year]  
                                                          Report #: [id number]
                                                                    [id number]   
_______________________________________________________________________________
THIS REPORT FOR:  
 
cc:  Shantell Leger MD, Aimee B. MD Morgan, Richard L. MD                                         ~
CC: Shantell Shipley
 
DATE OF SERVICE:  07/13/2020
 
 
Followup visit for chronic pain.
 
The patient returns to pain clinic today in followup for her chronic pain.  She
has multiple pain generators.  She is morbidly obese.  She complains of pain
with trochanteric bursitis and bilateral shoulder pain, left worse than right.
 
She has got a lot on her plate.  Her daughter has been diagnosed with MS and has
become dependent on she and her   They are trying to get her into a
nursing facility.  This is causing a great deal of stress.  I spent about 45
minutes with her at last visit, talking with her and she suffers from severe
depression.  Not a lot of things have gone well for her since her last
consultation on 06/22/2020.  She is here today for an injection to see if we can
provide some relief of at least her bursitis, which is affecting her sleep.
 
PQRS is unchanged from prior visit.  She has significant osteoarthritis
involving hips, back and shoulders.  Lots of spondylosis.  She is morbidly
obese.  BMI is 45.9, blood pressure 150/69, heart rate 67, respirations 16, O2
sat 100%.  Pain intensity 8/10.  She has not fallen.  She denies any use of
blood thinners, but has hypertension, which is under treatment by Dr. Leger. 
She is on an opioid agreement.  I have her on Butrans currently.  We discussed
transition today.  Opioid risk score is 1.  She is at low risk for addiction. 
Functional assessment score is 57, which I am sure is in part due to all the
psychosocial features ongoing in her life.  Her morbid obesity and chronic aches
and pains.  She denies use of tobacco and alcohol.
 
IMPRESSION:
1.  Severe depression, chronic.
2.  Severe chronic pain, multidimensional with multiple pain generators.
3.  Morbid obesity.
4.  Trochanteric bursitis.
5.  Complex regional pain syndrome involving the left lower extremity as a
result of severe knee degeneration with diffuse swelling and allodynia.
 
RECOMMENDATION:  Today, she is here for trochanteric bursa injection discussed
 
 
 
[address]   [address]                     PAIN MANAGEMENT CONSULTATION  
_______________________________________________________________________________
 
Name:       YUNIER TAVAREZ             Room #:                     REG [facility]#:      [id number]                       Account #:      [id number]  
Admission:  07/13/20    Attend Phys:    Vineet Shipley MD 
Discharge:              Date of Birth:  [birth year]  
                                                          Report #: [id number]
                                                                    [id number]   
_______________________________________________________________________________
at last visit.
 
PROCEDURE:  Skin was prepped with ChloraPrep and a 25-gauge needle advanced to
contact the trochanter and withdrawn slightly.  I then injected at that depth a
total of 5 mL of 0.5% bupivacaine mixed with 40 mg of triamcinolone into the
bursa.  Needle was removed.
 
We did make one change in her medicine today and we will see how the trial goes.
 She is not happy with the Butrans patches at 10 and cannot go up because she
says that she has side effects.  I am going to transition her to a different
opioid.  We will do opioid rotation to Nucynta.  She is on an antidepressant. 
Tapentadol effects norepinephrine not serotonin, so she should not have
serotonin syndrome.  I will put her on the lowest dose 50 mg b.i.d.  She is to
call our clinic and let us know how it is going.
 
 
 
 
 
 
 
 
 
 
 
 
 
 
 
 
 
 
 
 
 
 
 
 
 
 
 
 
 
 
                         
   By:                               
                   
D: 07/13/20 1426                           _____________________________________
T: 07/13/20 1507                           Vineet Shipley MD           /nt

## 2020-11-16 ENCOUNTER — HOSPITAL ENCOUNTER (OUTPATIENT)
Dept: HOSPITAL 35 - PAIN | Age: 70
End: 2020-11-16
Attending: ANESTHESIOLOGY
Payer: COMMERCIAL

## 2020-11-16 VITALS — WEIGHT: 245 LBS | BODY MASS INDEX: 48.1 KG/M2 | HEIGHT: 60 IN

## 2020-11-16 VITALS — DIASTOLIC BLOOD PRESSURE: 78 MMHG | SYSTOLIC BLOOD PRESSURE: 166 MMHG

## 2020-11-16 DIAGNOSIS — Z79.899: ICD-10-CM

## 2020-11-16 DIAGNOSIS — G89.4: Primary | ICD-10-CM

## 2020-11-16 DIAGNOSIS — L03.115: ICD-10-CM

## 2020-11-16 DIAGNOSIS — E66.01: ICD-10-CM

## 2020-11-16 DIAGNOSIS — F11.20: ICD-10-CM

## 2020-11-16 DIAGNOSIS — Z88.8: ICD-10-CM

## 2020-11-16 DIAGNOSIS — L03.116: ICD-10-CM

## 2021-01-11 ENCOUNTER — HOSPITAL ENCOUNTER (OUTPATIENT)
Dept: HOSPITAL 35 - HYPER | Age: 71
End: 2021-01-11
Attending: PREVENTIVE MEDICINE
Payer: COMMERCIAL

## 2021-01-11 DIAGNOSIS — Z96.653: ICD-10-CM

## 2021-01-11 DIAGNOSIS — L03.116: ICD-10-CM
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
## 2021-01-11 DIAGNOSIS — E66.01: ICD-10-CM

## 2021-01-11 DIAGNOSIS — K21.9: ICD-10-CM

## 2021-01-11 DIAGNOSIS — Z90.710: ICD-10-CM
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
## 2021-01-11 DIAGNOSIS — L02.416: ICD-10-CM

## 2021-01-11 DIAGNOSIS — Z86.718: ICD-10-CM

## 2021-01-11 DIAGNOSIS — F33.2: ICD-10-CM

## 2021-01-11 DIAGNOSIS — F41.9: ICD-10-CM

## 2021-01-11 DIAGNOSIS — D63.8: ICD-10-CM

## 2021-01-11 DIAGNOSIS — I10: ICD-10-CM

## 2021-01-11 DIAGNOSIS — E03.9: ICD-10-CM

## 2021-01-11 DIAGNOSIS — L97.822: ICD-10-CM

## 2021-01-11 DIAGNOSIS — E11.40: ICD-10-CM

## 2021-01-11 DIAGNOSIS — T81.89XA: Primary | ICD-10-CM

## 2021-01-11 DIAGNOSIS — E78.00: ICD-10-CM

## 2021-01-11 DIAGNOSIS — G90.09: ICD-10-CM

## 2021-01-11 DIAGNOSIS — E78.5: ICD-10-CM

## 2021-01-11 DIAGNOSIS — R60.0: ICD-10-CM

## 2021-01-11 DIAGNOSIS — M19.90: ICD-10-CM

## 2021-01-11 DIAGNOSIS — E11.622: ICD-10-CM

## 2021-01-25 ENCOUNTER — HOSPITAL ENCOUNTER (OUTPATIENT)
Dept: HOSPITAL 35 - HYPER | Age: 71
End: 2021-01-25
Attending: PREVENTIVE MEDICINE
Payer: COMMERCIAL

## 2021-01-25 DIAGNOSIS — K21.9: ICD-10-CM

## 2021-01-25 DIAGNOSIS — E11.42: ICD-10-CM

## 2021-01-25 DIAGNOSIS — T81.89XD: Primary | ICD-10-CM

## 2021-01-25 DIAGNOSIS — R60.0: ICD-10-CM

## 2021-01-25 DIAGNOSIS — Z86.718: ICD-10-CM

## 2021-01-25 DIAGNOSIS — E11.622: ICD-10-CM

## 2021-01-25 DIAGNOSIS — I10: ICD-10-CM

## 2021-01-25 DIAGNOSIS — L97.822: ICD-10-CM

## 2021-01-25 DIAGNOSIS — M19.90: ICD-10-CM

## 2021-01-25 DIAGNOSIS — F41.9: ICD-10-CM

## 2021-01-25 DIAGNOSIS — E66.01: ICD-10-CM

## 2021-01-25 DIAGNOSIS — G90.09: ICD-10-CM

## 2021-01-25 DIAGNOSIS — L03.116: ICD-10-CM

## 2021-01-25 DIAGNOSIS — E03.9: ICD-10-CM

## 2021-01-25 DIAGNOSIS — E78.00: ICD-10-CM
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
## 2021-01-25 DIAGNOSIS — F33.2: ICD-10-CM

## 2021-01-25 DIAGNOSIS — L02.416: ICD-10-CM

## 2021-01-25 DIAGNOSIS — D63.8: ICD-10-CM

## 2021-01-25 DIAGNOSIS — E78.5: ICD-10-CM

## 2021-02-08 ENCOUNTER — HOSPITAL ENCOUNTER (OUTPATIENT)
Dept: HOSPITAL 35 - HYPER | Age: 71
End: 2021-02-08
Attending: PREVENTIVE MEDICINE
Payer: COMMERCIAL

## 2021-02-08 DIAGNOSIS — R60.0: ICD-10-CM

## 2021-02-08 DIAGNOSIS — E03.9: ICD-10-CM

## 2021-02-08 DIAGNOSIS — E11.622: ICD-10-CM

## 2021-02-08 DIAGNOSIS — I10: ICD-10-CM
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
## 2021-02-08 DIAGNOSIS — Z86.718: ICD-10-CM

## 2021-02-08 DIAGNOSIS — D63.8: ICD-10-CM

## 2021-02-08 DIAGNOSIS — G90.09: ICD-10-CM

## 2021-02-08 DIAGNOSIS — E11.42: ICD-10-CM

## 2021-02-08 DIAGNOSIS — M19.90: ICD-10-CM

## 2021-02-08 DIAGNOSIS — L97.822: ICD-10-CM

## 2021-02-08 DIAGNOSIS — E78.00: ICD-10-CM

## 2021-02-08 DIAGNOSIS — L03.116: ICD-10-CM

## 2021-02-08 DIAGNOSIS — E66.01: ICD-10-CM

## 2021-02-08 DIAGNOSIS — F33.2: ICD-10-CM

## 2021-02-08 DIAGNOSIS — K21.9: ICD-10-CM

## 2021-02-08 DIAGNOSIS — E78.5: ICD-10-CM

## 2021-02-08 DIAGNOSIS — T81.89XD: Primary | ICD-10-CM

## 2021-02-08 DIAGNOSIS — F41.9: ICD-10-CM

## 2021-02-08 DIAGNOSIS — L02.416: ICD-10-CM

## 2021-02-18 ENCOUNTER — HOSPITAL ENCOUNTER (OUTPATIENT)
Dept: HOSPITAL 35 - PAIN | Age: 71
End: 2021-02-18
Attending: CLINICAL NURSE SPECIALIST
Payer: COMMERCIAL

## 2021-02-18 VITALS — SYSTOLIC BLOOD PRESSURE: 158 MMHG | DIASTOLIC BLOOD PRESSURE: 76 MMHG

## 2021-02-18 VITALS — BODY MASS INDEX: 42.8 KG/M2 | WEIGHT: 218 LBS | HEIGHT: 60 IN

## 2021-02-18 DIAGNOSIS — Z79.899: ICD-10-CM

## 2021-02-18 DIAGNOSIS — L03.90: ICD-10-CM

## 2021-02-18 DIAGNOSIS — Z88.8: ICD-10-CM

## 2021-02-18 DIAGNOSIS — F41.8: ICD-10-CM

## 2021-02-18 DIAGNOSIS — E66.01: ICD-10-CM

## 2021-02-18 DIAGNOSIS — G89.4: Primary | ICD-10-CM

## 2021-02-18 NOTE — NUR
Pain Clinic Assessment:
 
1. History of Osteoarthritis:
HIPS
BACK
   History of Rheumatoid Arthritis:
Not Applicable
 
2. Height: 5 ft. 0 in. 152.4 cm.
   Weight: 218.0 lb.  oz. 98.884 kg.
   Patient's BMI: 42.6
 
3. Vital Signs:
   BP: 158/76 Pulse: 75 Resp: 16
   Temp:  02 Sat: 99 ECG Mon:
 
4. Pain Intensity: 3 SITTING  7 WALKING
 
5. Fall Risk:
   Dizziness: N  Needs help standing or walking: Y
   Fallen in the last 3 months: N
   Fall risk comments:
WHEELCHAIR
 
6. Patient on Blood Thinner: None
 
7. History of Hypertension: Y
 
8. Opioid Therapy greater than 6 weeks: Y
   Opiate Contract Signed:
 
9. Risk Assessment Tool Provided: LOW RISK 1/3
 
10. Functional Assessment Tool: 62/70
 
11. Recreational Drug Use: Never Drug Type:
    Tobacco Use: Never Smoker Tobacco Type:
       Amount or Packs/day:  How Many Years:
    Alcohol Use: No  Frequency:  Quant:

## 2021-02-22 ENCOUNTER — HOSPITAL ENCOUNTER (OUTPATIENT)
Dept: HOSPITAL 35 - HYPER | Age: 71
End: 2021-02-22
Attending: PREVENTIVE MEDICINE
Payer: COMMERCIAL

## 2021-02-22 DIAGNOSIS — F33.2: ICD-10-CM

## 2021-02-22 DIAGNOSIS — L03.116: ICD-10-CM
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
## 2021-02-22 DIAGNOSIS — T81.89XD: Primary | ICD-10-CM

## 2021-02-22 DIAGNOSIS — Z86.718: ICD-10-CM

## 2021-02-22 DIAGNOSIS — K21.9: ICD-10-CM

## 2021-02-22 DIAGNOSIS — E11.622: ICD-10-CM

## 2021-02-22 DIAGNOSIS — E11.42: ICD-10-CM

## 2021-02-22 DIAGNOSIS — G90.09: ICD-10-CM

## 2021-02-22 DIAGNOSIS — D63.8: ICD-10-CM

## 2021-02-22 DIAGNOSIS — E78.5: ICD-10-CM

## 2021-02-22 DIAGNOSIS — E66.01: ICD-10-CM

## 2021-02-22 DIAGNOSIS — I10: ICD-10-CM

## 2021-02-22 DIAGNOSIS — E78.00: ICD-10-CM

## 2021-02-22 DIAGNOSIS — L02.416: ICD-10-CM

## 2021-02-22 DIAGNOSIS — R60.0: ICD-10-CM

## 2021-02-22 DIAGNOSIS — M19.90: ICD-10-CM

## 2021-02-22 DIAGNOSIS — L97.822: ICD-10-CM

## 2021-02-22 DIAGNOSIS — F41.9: ICD-10-CM

## 2021-02-22 DIAGNOSIS — E03.9: ICD-10-CM

## 2021-03-08 ENCOUNTER — HOSPITAL ENCOUNTER (OUTPATIENT)
Dept: HOSPITAL 35 - HYPER | Age: 71
End: 2021-03-08
Attending: PREVENTIVE MEDICINE
Payer: COMMERCIAL

## 2021-03-08 DIAGNOSIS — R60.0: ICD-10-CM

## 2021-03-08 DIAGNOSIS — K21.9: ICD-10-CM

## 2021-03-08 DIAGNOSIS — G90.09: ICD-10-CM

## 2021-03-08 DIAGNOSIS — T81.89XD: Primary | ICD-10-CM

## 2021-03-08 DIAGNOSIS — I10: ICD-10-CM

## 2021-03-08 DIAGNOSIS — E03.9: ICD-10-CM

## 2021-03-08 DIAGNOSIS — E78.5: ICD-10-CM

## 2021-03-08 DIAGNOSIS — F41.9: ICD-10-CM

## 2021-03-08 DIAGNOSIS — F33.2: ICD-10-CM

## 2021-03-08 DIAGNOSIS — E78.00: ICD-10-CM

## 2021-03-08 DIAGNOSIS — Z86.718: ICD-10-CM

## 2021-03-08 DIAGNOSIS — E11.622: ICD-10-CM

## 2021-03-08 DIAGNOSIS — E11.42: ICD-10-CM

## 2021-03-08 DIAGNOSIS — D63.8: ICD-10-CM
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
## 2021-03-08 DIAGNOSIS — L02.416: ICD-10-CM

## 2021-03-08 DIAGNOSIS — M19.90: ICD-10-CM

## 2021-03-08 DIAGNOSIS — E66.01: ICD-10-CM

## 2021-03-08 DIAGNOSIS — L97.822: ICD-10-CM

## 2021-03-08 DIAGNOSIS — L03.116: ICD-10-CM

## 2021-03-29 ENCOUNTER — HOSPITAL ENCOUNTER (OUTPATIENT)
Dept: HOSPITAL 35 - HYPER | Age: 71
End: 2021-03-29
Attending: PREVENTIVE MEDICINE
Payer: COMMERCIAL

## 2021-03-29 DIAGNOSIS — G90.09: ICD-10-CM
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
## 2021-03-29 DIAGNOSIS — E78.00: ICD-10-CM

## 2021-03-29 DIAGNOSIS — E66.01: ICD-10-CM

## 2021-03-29 DIAGNOSIS — R60.0: ICD-10-CM

## 2021-03-29 DIAGNOSIS — F41.9: ICD-10-CM

## 2021-03-29 DIAGNOSIS — L97.822: ICD-10-CM

## 2021-03-29 DIAGNOSIS — L02.416: ICD-10-CM

## 2021-03-29 DIAGNOSIS — K21.9: ICD-10-CM

## 2021-03-29 DIAGNOSIS — E11.42: ICD-10-CM

## 2021-03-29 DIAGNOSIS — E03.9: ICD-10-CM

## 2021-03-29 DIAGNOSIS — E11.622: ICD-10-CM

## 2021-03-29 DIAGNOSIS — M19.90: ICD-10-CM

## 2021-03-29 DIAGNOSIS — D63.8: ICD-10-CM

## 2021-03-29 DIAGNOSIS — F33.2: ICD-10-CM

## 2021-03-29 DIAGNOSIS — L03.116: ICD-10-CM

## 2021-03-29 DIAGNOSIS — T81.89XD: Primary | ICD-10-CM

## 2021-03-29 DIAGNOSIS — E78.5: ICD-10-CM

## 2021-03-29 DIAGNOSIS — Z86.718: ICD-10-CM

## 2021-03-29 DIAGNOSIS — I10: ICD-10-CM

## 2021-04-12 ENCOUNTER — HOSPITAL ENCOUNTER (OUTPATIENT)
Dept: HOSPITAL 35 - HYPER | Age: 71
End: 2021-04-12
Attending: PREVENTIVE MEDICINE
Payer: COMMERCIAL

## 2021-04-12 DIAGNOSIS — E78.00: ICD-10-CM
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
## 2021-04-12 DIAGNOSIS — F41.9: ICD-10-CM

## 2021-04-12 DIAGNOSIS — E78.5: ICD-10-CM

## 2021-04-12 DIAGNOSIS — R60.0: ICD-10-CM

## 2021-04-12 DIAGNOSIS — E11.622: ICD-10-CM

## 2021-04-12 DIAGNOSIS — E03.9: ICD-10-CM

## 2021-04-12 DIAGNOSIS — T81.89XD: Primary | ICD-10-CM

## 2021-04-12 DIAGNOSIS — L97.822: ICD-10-CM

## 2021-04-12 DIAGNOSIS — E11.42: ICD-10-CM

## 2021-04-12 DIAGNOSIS — D63.8: ICD-10-CM

## 2021-04-12 DIAGNOSIS — Z86.718: ICD-10-CM

## 2021-04-12 DIAGNOSIS — E66.01: ICD-10-CM

## 2021-04-12 DIAGNOSIS — F33.2: ICD-10-CM

## 2021-04-12 DIAGNOSIS — G90.09: ICD-10-CM

## 2021-04-12 DIAGNOSIS — K21.9: ICD-10-CM

## 2021-04-12 DIAGNOSIS — M19.90: ICD-10-CM

## 2021-04-12 DIAGNOSIS — L03.116: ICD-10-CM

## 2021-04-12 DIAGNOSIS — L02.416: ICD-10-CM

## 2021-04-12 DIAGNOSIS — I10: ICD-10-CM

## 2021-05-03 ENCOUNTER — HOSPITAL ENCOUNTER (OUTPATIENT)
Dept: HOSPITAL 35 - HYPER | Age: 71
End: 2021-05-03
Attending: PREVENTIVE MEDICINE
Payer: COMMERCIAL

## 2021-05-03 DIAGNOSIS — R60.0: ICD-10-CM

## 2021-05-03 DIAGNOSIS — E11.622: ICD-10-CM

## 2021-05-03 DIAGNOSIS — L03.116: ICD-10-CM
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
## 2021-05-03 DIAGNOSIS — L97.822: ICD-10-CM

## 2021-05-03 DIAGNOSIS — E78.00: ICD-10-CM

## 2021-05-03 DIAGNOSIS — E66.01: ICD-10-CM

## 2021-05-03 DIAGNOSIS — T81.89XD: Primary | ICD-10-CM

## 2021-05-03 DIAGNOSIS — Z86.718: ICD-10-CM

## 2021-05-03 DIAGNOSIS — L02.416: ICD-10-CM

## 2021-05-03 DIAGNOSIS — E11.42: ICD-10-CM

## 2021-05-03 DIAGNOSIS — E03.9: ICD-10-CM

## 2021-05-03 DIAGNOSIS — F33.2: ICD-10-CM

## 2021-05-03 DIAGNOSIS — I10: ICD-10-CM

## 2021-05-03 DIAGNOSIS — G90.09: ICD-10-CM

## 2021-05-03 DIAGNOSIS — F41.9: ICD-10-CM

## 2021-05-03 DIAGNOSIS — M19.90: ICD-10-CM

## 2021-05-03 DIAGNOSIS — K21.9: ICD-10-CM

## 2021-05-03 DIAGNOSIS — E78.5: ICD-10-CM

## 2021-05-03 DIAGNOSIS — D63.8: ICD-10-CM

## 2021-05-10 ENCOUNTER — HOSPITAL ENCOUNTER (OUTPATIENT)
Dept: HOSPITAL 35 - PAIN | Age: 71
End: 2021-05-10
Attending: CLINICAL NURSE SPECIALIST
Payer: COMMERCIAL

## 2021-05-10 VITALS — DIASTOLIC BLOOD PRESSURE: 66 MMHG | SYSTOLIC BLOOD PRESSURE: 150 MMHG

## 2021-05-10 VITALS — HEIGHT: 60 IN | BODY MASS INDEX: 48.1 KG/M2 | WEIGHT: 245 LBS

## 2021-05-10 DIAGNOSIS — F11.20: ICD-10-CM

## 2021-05-10 DIAGNOSIS — Z88.8: ICD-10-CM

## 2021-05-10 DIAGNOSIS — E66.01: ICD-10-CM

## 2021-05-10 DIAGNOSIS — F41.8: ICD-10-CM

## 2021-05-10 DIAGNOSIS — Z79.899: ICD-10-CM

## 2021-05-10 DIAGNOSIS — L03.116: ICD-10-CM

## 2021-05-10 DIAGNOSIS — G89.4: Primary | ICD-10-CM

## 2021-05-10 NOTE — NUR
Pain Clinic Assessment:
 
1. History of Osteoarthritis:
HIPS
BACK
KNEES
   History of Rheumatoid Arthritis:
Not Applicable
 
2. Height: 5 ft. 0 in. 152.4 cm.
   Weight: 245.0 lb.  oz. 111.132 kg.
   Patient's BMI: 47.8
 
3. Vital Signs:
   BP: 150/66 Pulse: 66 Resp: 20
   Temp:  02 Sat: 96 ECG Mon:
 
4. Pain Intensity: 4-SITTING; 9-WALKING
 
5. Fall Risk:
   Dizziness: N  Needs help standing or walking: N
   Fallen in the last 3 months: N
   Fall risk comments:
WHEELCHAIR
 
6. Patient on Blood Thinner: None
 
7. History of Hypertension: Y
 
8. Opioid Therapy greater than 6 weeks: Y
   Opiate Contract Signed:
 
9. Risk Assessment Tool Provided: LOW RISK 1/3
 
10. Functional Assessment Tool: 62/70
 
11. Recreational Drug Use: Never Drug Type:
    Tobacco Use: Never Smoker Tobacco Type:
       Amount or Packs/day:  How Many Years:
    Alcohol Use: No  Frequency:  Quant:

## 2021-05-17 ENCOUNTER — HOSPITAL ENCOUNTER (OUTPATIENT)
Dept: HOSPITAL 35 - HYPER | Age: 71
End: 2021-05-17
Attending: PREVENTIVE MEDICINE
Payer: COMMERCIAL
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
## 2021-05-17 DIAGNOSIS — M19.90: ICD-10-CM

## 2021-05-17 DIAGNOSIS — E03.9: ICD-10-CM

## 2021-05-17 DIAGNOSIS — K21.9: ICD-10-CM

## 2021-05-17 DIAGNOSIS — F33.2: ICD-10-CM

## 2021-05-17 DIAGNOSIS — E78.5: ICD-10-CM

## 2021-05-17 DIAGNOSIS — E11.622: ICD-10-CM

## 2021-05-17 DIAGNOSIS — E11.42: ICD-10-CM

## 2021-05-17 DIAGNOSIS — F41.9: ICD-10-CM

## 2021-05-17 DIAGNOSIS — G90.09: ICD-10-CM

## 2021-05-17 DIAGNOSIS — R60.0: ICD-10-CM

## 2021-05-17 DIAGNOSIS — I10: ICD-10-CM

## 2021-05-17 DIAGNOSIS — D63.8: ICD-10-CM

## 2021-05-17 DIAGNOSIS — L97.822: ICD-10-CM

## 2021-05-17 DIAGNOSIS — L03.116: ICD-10-CM

## 2021-05-17 DIAGNOSIS — E78.00: ICD-10-CM

## 2021-05-17 DIAGNOSIS — E66.01: ICD-10-CM

## 2021-05-17 DIAGNOSIS — T81.89XD: Primary | ICD-10-CM

## 2021-05-17 DIAGNOSIS — Z86.718: ICD-10-CM

## 2021-05-17 DIAGNOSIS — L02.416: ICD-10-CM

## 2021-08-02 ENCOUNTER — HOSPITAL ENCOUNTER (OUTPATIENT)
Dept: HOSPITAL 35 - PAIN | Age: 71
End: 2021-08-02
Attending: CLINICAL NURSE SPECIALIST
Payer: COMMERCIAL

## 2021-08-02 VITALS — SYSTOLIC BLOOD PRESSURE: 140 MMHG | DIASTOLIC BLOOD PRESSURE: 65 MMHG

## 2021-08-02 DIAGNOSIS — E66.01: ICD-10-CM

## 2021-08-02 DIAGNOSIS — Z79.899: ICD-10-CM

## 2021-08-02 DIAGNOSIS — I10: ICD-10-CM

## 2021-08-02 DIAGNOSIS — Z79.891: ICD-10-CM

## 2021-08-02 DIAGNOSIS — G89.4: Primary | ICD-10-CM

## 2021-08-02 DIAGNOSIS — F32.9: ICD-10-CM

## 2021-08-02 NOTE — NUR
Pain Clinic Assessment:
 
1. History of Osteoarthritis:
HIPS
BACK
KNEES
   History of Rheumatoid Arthritis:
Not Applicable
 
2. Height: 5 ft. 0 in. 152.4 cm.
   Weight:  lb.  oz.  kg.
   Patient's BMI:
 
3. Vital Signs:
   BP: 140/65 Pulse: 63 Resp: 16
   Temp:  02 Sat: 100 ECG Mon:
 
4. Pain Intensity: 4-SITTING; 9-WALKING
 
5. Fall Risk:
   Dizziness: N  Needs help standing or walking: Y
   Fallen in the last 3 months: N
   Fall risk comments:
WHEELCHAIR
 
6. Patient on Blood Thinner: None
 
7. History of Hypertension: Y
 
8. Opioid Therapy greater than 6 weeks: Y
   Opiate Contract Signed:
 
9. Risk Assessment Tool Provided: LOW RISK 1/3
 
10. Functional Assessment Tool: 62/70
 
11. Recreational Drug Use: Never Drug Type:
    Tobacco Use: Never Smoker Tobacco Type:
       Amount or Packs/day:  How Many Years:
    Alcohol Use: No  Frequency:  Quant:

## 2021-11-29 ENCOUNTER — HOSPITAL ENCOUNTER (OUTPATIENT)
Dept: HOSPITAL 35 - PAIN | Age: 71
End: 2021-11-29
Attending: ANESTHESIOLOGY
Payer: COMMERCIAL

## 2021-11-29 VITALS — DIASTOLIC BLOOD PRESSURE: 68 MMHG | SYSTOLIC BLOOD PRESSURE: 148 MMHG

## 2021-11-29 VITALS — HEIGHT: 60 IN | BODY MASS INDEX: 40.64 KG/M2 | WEIGHT: 207 LBS

## 2021-11-29 DIAGNOSIS — Z88.8: ICD-10-CM

## 2021-11-29 DIAGNOSIS — Z79.82: ICD-10-CM

## 2021-11-29 DIAGNOSIS — M25.562: ICD-10-CM

## 2021-11-29 DIAGNOSIS — Z79.84: ICD-10-CM

## 2021-11-29 DIAGNOSIS — Z89.612: ICD-10-CM

## 2021-11-29 DIAGNOSIS — G89.29: Primary | ICD-10-CM

## 2021-11-29 DIAGNOSIS — Z79.899: ICD-10-CM

## 2021-11-29 NOTE — NUR
Pain Clinic Assessment:
 
1. History of Osteoarthritis:
HIPS
BACK
KNEES
   History of Rheumatoid Arthritis:
Not Applicable
 
2. Height: 5 ft. 0 in. 152.4 cm.
   Weight: 207.0 lb.  oz. 93.895 kg.
   Patient's BMI: 40.4
 
3. Vital Signs:
   BP: 148/68 Pulse: 61 Resp: 14
   Temp:  02 Sat: 100 ECG Mon:
 
4. Pain Intensity: 6
 
5. Fall Risk:
   Dizziness: N  Needs help standing or walking: Y
   Fallen in the last 3 months: N
   Fall risk comments:
WHEELCHAIR
 
6. Patient on Blood Thinner: None
 
7. History of Hypertension: Y
 
8. Opioid Therapy greater than 6 weeks: Y
   Opiate Contract Signed:
 
9. Risk Assessment Tool Provided: LOW RISK 1/3
 
10. Functional Assessment Tool: 62/70
 
11. Recreational Drug Use: Never Drug Type:
    Tobacco Use: Never Smoker Tobacco Type:
       Amount or Packs/day:  How Many Years:
    Alcohol Use: No  Frequency:  Quant:

## 2021-12-01 ENCOUNTER — HOSPITAL ENCOUNTER (OUTPATIENT)
Dept: HOSPITAL 35 - HYPER | Age: 71
End: 2021-12-01
Attending: PREVENTIVE MEDICINE
Payer: COMMERCIAL

## 2021-12-01 DIAGNOSIS — F41.9: ICD-10-CM

## 2021-12-01 DIAGNOSIS — E11.42: ICD-10-CM

## 2021-12-01 DIAGNOSIS — L03.116: ICD-10-CM

## 2021-12-01 DIAGNOSIS — Z86.718: ICD-10-CM

## 2021-12-01 DIAGNOSIS — E03.9: ICD-10-CM

## 2021-12-01 DIAGNOSIS — E78.5: ICD-10-CM

## 2021-12-01 DIAGNOSIS — T87.81: Primary | ICD-10-CM

## 2021-12-01 DIAGNOSIS — E66.01: ICD-10-CM

## 2021-12-01 DIAGNOSIS — L97.122: ICD-10-CM

## 2021-12-01 DIAGNOSIS — E11.622: ICD-10-CM

## 2021-12-01 DIAGNOSIS — Z87.01: ICD-10-CM

## 2021-12-01 DIAGNOSIS — T87.44: ICD-10-CM

## 2021-12-01 DIAGNOSIS — E78.00: ICD-10-CM

## 2021-12-01 DIAGNOSIS — M19.90: ICD-10-CM

## 2021-12-01 DIAGNOSIS — I10: ICD-10-CM
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
## 2021-12-01 DIAGNOSIS — F32.9: ICD-10-CM

## 2021-12-01 DIAGNOSIS — K21.9: ICD-10-CM

## 2021-12-09 ENCOUNTER — HOSPITAL ENCOUNTER (OUTPATIENT)
Dept: HOSPITAL 35 - HYPER | Age: 71
End: 2021-12-09
Attending: PREVENTIVE MEDICINE
Payer: COMMERCIAL

## 2021-12-09 DIAGNOSIS — T87.44: ICD-10-CM

## 2021-12-09 DIAGNOSIS — I10: ICD-10-CM

## 2021-12-09 DIAGNOSIS — M19.90: ICD-10-CM

## 2021-12-09 DIAGNOSIS — Z86.718: ICD-10-CM

## 2021-12-09 DIAGNOSIS — Z79.82: ICD-10-CM

## 2021-12-09 DIAGNOSIS — K21.9: ICD-10-CM

## 2021-12-09 DIAGNOSIS — Z79.84: ICD-10-CM

## 2021-12-09 DIAGNOSIS — E78.5: ICD-10-CM

## 2021-12-09 DIAGNOSIS — L97.122: ICD-10-CM

## 2021-12-09 DIAGNOSIS — E66.01: ICD-10-CM

## 2021-12-09 DIAGNOSIS — E11.42: ICD-10-CM
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
## 2021-12-09 DIAGNOSIS — Z87.01: ICD-10-CM

## 2021-12-09 DIAGNOSIS — Z89.612: ICD-10-CM

## 2021-12-09 DIAGNOSIS — E03.9: ICD-10-CM

## 2021-12-09 DIAGNOSIS — T87.81: Primary | ICD-10-CM

## 2021-12-09 DIAGNOSIS — E78.00: ICD-10-CM

## 2021-12-09 DIAGNOSIS — F41.9: ICD-10-CM

## 2021-12-09 DIAGNOSIS — E11.622: ICD-10-CM

## 2021-12-09 DIAGNOSIS — F32.9: ICD-10-CM

## 2021-12-09 DIAGNOSIS — Z79.899: ICD-10-CM

## 2021-12-09 DIAGNOSIS — L03.115: ICD-10-CM

## 2021-12-16 ENCOUNTER — HOSPITAL ENCOUNTER (OUTPATIENT)
Dept: HOSPITAL 35 - HYPER | Age: 71
End: 2021-12-16
Attending: PREVENTIVE MEDICINE
Payer: COMMERCIAL

## 2021-12-16 DIAGNOSIS — Z86.718: ICD-10-CM

## 2021-12-16 DIAGNOSIS — Z79.899: ICD-10-CM

## 2021-12-16 DIAGNOSIS — E03.9: ICD-10-CM

## 2021-12-16 DIAGNOSIS — E11.622: ICD-10-CM

## 2021-12-16 DIAGNOSIS — F33.2: ICD-10-CM

## 2021-12-16 DIAGNOSIS — T87.81: Primary | ICD-10-CM

## 2021-12-16 DIAGNOSIS — T87.44: ICD-10-CM

## 2021-12-16 DIAGNOSIS — Z79.84: ICD-10-CM

## 2021-12-16 DIAGNOSIS — L97.122: ICD-10-CM

## 2021-12-16 DIAGNOSIS — Z79.82: ICD-10-CM

## 2021-12-16 DIAGNOSIS — E78.5: ICD-10-CM

## 2021-12-16 DIAGNOSIS — K21.9: ICD-10-CM

## 2021-12-16 DIAGNOSIS — F41.9: ICD-10-CM

## 2021-12-16 DIAGNOSIS — E66.01: ICD-10-CM

## 2021-12-16 DIAGNOSIS — M19.90: ICD-10-CM

## 2021-12-16 DIAGNOSIS — I10: ICD-10-CM

## 2021-12-16 DIAGNOSIS — E78.00: ICD-10-CM
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
## 2021-12-16 DIAGNOSIS — Z87.01: ICD-10-CM

## 2021-12-16 DIAGNOSIS — E11.42: ICD-10-CM

## 2021-12-29 ENCOUNTER — HOSPITAL ENCOUNTER (OUTPATIENT)
Dept: HOSPITAL 35 - HYPER | Age: 71
End: 2021-12-29
Attending: PREVENTIVE MEDICINE
Payer: COMMERCIAL

## 2021-12-29 DIAGNOSIS — T87.44: Primary | ICD-10-CM

## 2021-12-29 DIAGNOSIS — Z86.718: ICD-10-CM

## 2021-12-29 DIAGNOSIS — Z87.01: ICD-10-CM

## 2021-12-29 DIAGNOSIS — E11.622: ICD-10-CM
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
## 2021-12-29 DIAGNOSIS — E66.01: ICD-10-CM

## 2021-12-29 DIAGNOSIS — F41.9: ICD-10-CM

## 2021-12-29 DIAGNOSIS — I10: ICD-10-CM

## 2021-12-29 DIAGNOSIS — Z79.84: ICD-10-CM

## 2021-12-29 DIAGNOSIS — E78.5: ICD-10-CM

## 2021-12-29 DIAGNOSIS — Z79.82: ICD-10-CM

## 2021-12-29 DIAGNOSIS — E11.42: ICD-10-CM

## 2021-12-29 DIAGNOSIS — E78.00: ICD-10-CM

## 2021-12-29 DIAGNOSIS — F33.2: ICD-10-CM

## 2021-12-29 DIAGNOSIS — E03.9: ICD-10-CM

## 2021-12-29 DIAGNOSIS — L97.122: ICD-10-CM

## 2021-12-29 DIAGNOSIS — M19.90: ICD-10-CM

## 2021-12-29 DIAGNOSIS — K21.9: ICD-10-CM

## 2022-01-12 ENCOUNTER — HOSPITAL ENCOUNTER (OUTPATIENT)
Dept: HOSPITAL 35 - HYPER | Age: 72
End: 2022-01-12
Attending: PREVENTIVE MEDICINE
Payer: COMMERCIAL

## 2022-01-12 DIAGNOSIS — Z86.718: ICD-10-CM

## 2022-01-12 DIAGNOSIS — K21.9: ICD-10-CM

## 2022-01-12 DIAGNOSIS — Z87.01: ICD-10-CM
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
## 2022-01-12 DIAGNOSIS — L97.122: ICD-10-CM

## 2022-01-12 DIAGNOSIS — Z79.84: ICD-10-CM

## 2022-01-12 DIAGNOSIS — F41.9: ICD-10-CM

## 2022-01-12 DIAGNOSIS — E11.622: ICD-10-CM

## 2022-01-12 DIAGNOSIS — Z79.82: ICD-10-CM

## 2022-01-12 DIAGNOSIS — M19.90: ICD-10-CM

## 2022-01-12 DIAGNOSIS — E78.5: ICD-10-CM

## 2022-01-12 DIAGNOSIS — T87.44: Primary | ICD-10-CM

## 2022-01-12 DIAGNOSIS — E78.00: ICD-10-CM

## 2022-01-12 DIAGNOSIS — I10: ICD-10-CM

## 2022-01-12 DIAGNOSIS — E11.42: ICD-10-CM

## 2022-01-12 DIAGNOSIS — E66.01: ICD-10-CM

## 2022-01-12 DIAGNOSIS — E03.9: ICD-10-CM

## 2022-01-12 DIAGNOSIS — F33.2: ICD-10-CM

## 2022-01-26 ENCOUNTER — HOSPITAL ENCOUNTER (OUTPATIENT)
Dept: HOSPITAL 35 - HYPER | Age: 72
End: 2022-01-26
Attending: PREVENTIVE MEDICINE
Payer: COMMERCIAL

## 2022-01-26 DIAGNOSIS — E03.9: ICD-10-CM

## 2022-01-26 DIAGNOSIS — E78.5: ICD-10-CM

## 2022-01-26 DIAGNOSIS — K21.9: ICD-10-CM

## 2022-01-26 DIAGNOSIS — E78.00: ICD-10-CM
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
## 2022-01-26 DIAGNOSIS — L97.122: ICD-10-CM

## 2022-01-26 DIAGNOSIS — T87.44: Primary | ICD-10-CM

## 2022-01-26 DIAGNOSIS — E66.01: ICD-10-CM

## 2022-01-26 DIAGNOSIS — Z86.718: ICD-10-CM

## 2022-01-26 DIAGNOSIS — Z79.82: ICD-10-CM

## 2022-01-26 DIAGNOSIS — F41.9: ICD-10-CM

## 2022-01-26 DIAGNOSIS — I10: ICD-10-CM

## 2022-01-26 DIAGNOSIS — Z79.899: ICD-10-CM

## 2022-01-26 DIAGNOSIS — E11.622: ICD-10-CM

## 2022-01-26 DIAGNOSIS — L03.115: ICD-10-CM

## 2022-01-26 DIAGNOSIS — E11.42: ICD-10-CM

## 2022-01-26 DIAGNOSIS — Z79.84: ICD-10-CM

## 2022-01-26 DIAGNOSIS — T87.81: ICD-10-CM

## 2022-01-26 DIAGNOSIS — F32.9: ICD-10-CM

## 2022-01-26 DIAGNOSIS — Z87.01: ICD-10-CM

## 2022-01-26 DIAGNOSIS — M19.90: ICD-10-CM

## 2022-02-09 ENCOUNTER — HOSPITAL ENCOUNTER (OUTPATIENT)
Dept: HOSPITAL 35 - HYPER | Age: 72
End: 2022-02-09
Attending: PREVENTIVE MEDICINE
Payer: COMMERCIAL
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
## 2022-02-09 DIAGNOSIS — E11.42: ICD-10-CM

## 2022-02-09 DIAGNOSIS — E66.01: ICD-10-CM

## 2022-02-09 DIAGNOSIS — Z87.01: ICD-10-CM

## 2022-02-09 DIAGNOSIS — E11.622: ICD-10-CM

## 2022-02-09 DIAGNOSIS — T81.49XD: ICD-10-CM

## 2022-02-09 DIAGNOSIS — L03.115: ICD-10-CM

## 2022-02-09 DIAGNOSIS — K21.9: ICD-10-CM

## 2022-02-09 DIAGNOSIS — I10: ICD-10-CM

## 2022-02-09 DIAGNOSIS — F33.9: ICD-10-CM

## 2022-02-09 DIAGNOSIS — Z86.718: ICD-10-CM

## 2022-02-09 DIAGNOSIS — M19.90: ICD-10-CM

## 2022-02-09 DIAGNOSIS — Z79.84: ICD-10-CM

## 2022-02-09 DIAGNOSIS — E78.00: ICD-10-CM

## 2022-02-09 DIAGNOSIS — T81.31XD: Primary | ICD-10-CM

## 2022-02-09 DIAGNOSIS — F41.9: ICD-10-CM

## 2022-02-09 DIAGNOSIS — E78.5: ICD-10-CM

## 2022-02-09 DIAGNOSIS — E03.9: ICD-10-CM

## 2022-02-09 DIAGNOSIS — Z79.82: ICD-10-CM

## 2022-02-09 DIAGNOSIS — L97.122: ICD-10-CM

## 2022-02-09 DIAGNOSIS — Z79.899: ICD-10-CM

## 2022-02-23 ENCOUNTER — HOSPITAL ENCOUNTER (OUTPATIENT)
Dept: HOSPITAL 35 - HYPER | Age: 72
End: 2022-02-23
Attending: PREVENTIVE MEDICINE
Payer: COMMERCIAL

## 2022-02-23 DIAGNOSIS — T87.44: ICD-10-CM

## 2022-02-23 DIAGNOSIS — T87.81: Primary | ICD-10-CM

## 2022-02-23 DIAGNOSIS — E03.9: ICD-10-CM

## 2022-02-23 DIAGNOSIS — Z79.899: ICD-10-CM

## 2022-02-23 DIAGNOSIS — Z86.718: ICD-10-CM

## 2022-02-23 DIAGNOSIS — E11.42: ICD-10-CM

## 2022-02-23 DIAGNOSIS — E78.5: ICD-10-CM

## 2022-02-23 DIAGNOSIS — F33.2: ICD-10-CM

## 2022-02-23 DIAGNOSIS — E11.21: ICD-10-CM
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
## 2022-02-23 DIAGNOSIS — M19.90: ICD-10-CM

## 2022-02-23 DIAGNOSIS — E78.00: ICD-10-CM

## 2022-02-23 DIAGNOSIS — E66.01: ICD-10-CM

## 2022-02-23 DIAGNOSIS — F41.9: ICD-10-CM

## 2022-02-23 DIAGNOSIS — I10: ICD-10-CM

## 2022-02-23 DIAGNOSIS — E11.622: ICD-10-CM

## 2022-02-23 DIAGNOSIS — Z87.01: ICD-10-CM

## 2022-02-23 DIAGNOSIS — K21.9: ICD-10-CM

## 2022-02-23 DIAGNOSIS — Z79.84: ICD-10-CM

## 2022-02-23 DIAGNOSIS — L97.122: ICD-10-CM

## 2022-02-23 DIAGNOSIS — M79.662: ICD-10-CM

## 2022-02-23 DIAGNOSIS — Z79.82: ICD-10-CM

## 2022-02-28 ENCOUNTER — HOSPITAL ENCOUNTER (OUTPATIENT)
Dept: HOSPITAL 35 - PAIN | Age: 72
End: 2022-02-28
Attending: CLINICAL NURSE SPECIALIST
Payer: COMMERCIAL

## 2022-02-28 VITALS — DIASTOLIC BLOOD PRESSURE: 67 MMHG | SYSTOLIC BLOOD PRESSURE: 147 MMHG

## 2022-02-28 VITALS — BODY MASS INDEX: 41.5 KG/M2 | HEIGHT: 60 IN | WEIGHT: 211.4 LBS

## 2022-02-28 DIAGNOSIS — G89.29: Primary | ICD-10-CM

## 2022-02-28 DIAGNOSIS — Z89.522: ICD-10-CM

## 2022-02-28 DIAGNOSIS — F41.8: ICD-10-CM

## 2022-02-28 DIAGNOSIS — Z79.82: ICD-10-CM

## 2022-02-28 DIAGNOSIS — Z79.899: ICD-10-CM

## 2022-02-28 DIAGNOSIS — M19.90: ICD-10-CM

## 2022-02-28 DIAGNOSIS — Z88.8: ICD-10-CM

## 2022-02-28 DIAGNOSIS — Z79.84: ICD-10-CM

## 2022-02-28 NOTE — NUR
Pain Clinic Assessment:
 
1. History of Osteoarthritis:
HIPS
BACK
KNEES
   History of Rheumatoid Arthritis:
Not Applicable
 
2. Height: 5 ft. 0 in. 152.4 cm.
   Weight: 211.4 lb.  oz. 95.891 kg.
   Patient's BMI: 41.3
 
3. Vital Signs:
   BP: 147/67 Pulse: 65 Resp: 18
   Temp:  02 Sat: 98 ECG Mon:
 
4. Pain Intensity: 1
 
5. Fall Risk:
   Dizziness: N  Needs help standing or walking: Y
   Fallen in the last 3 months: Y
   Fall risk comments:
WHEELCHAIR
 
6. Patient on Blood Thinner: None
 
7. History of Hypertension: Y
 
8. Opioid Therapy greater than 6 weeks: Y
   Opiate Contract Signed:
 
9. Risk Assessment Tool Provided: LOW RISK 1
 
10. Functional Assessment Tool: 62/70
 
11. Recreational Drug Use: Never Drug Type:
    Tobacco Use: Never Smoker Tobacco Type:
       Amount or Packs/day:  How Many Years:
    Alcohol Use: No  Frequency:  Quant:

## 2023-12-17 NOTE — NUR
Pain Clinic Assessment:
 
1. History of Osteoarthritis:
HIPS
BACK
KNEES
   History of Rheumatoid Arthritis:
Not Applicable
 
2. Height: 5 ft. 0 in. 152.4 cm.
   Weight: 245.0 lb.  oz. 111.132 kg.
   Patient's BMI: 47.8
 
3. Vital Signs:
   BP: 150/66 Pulse: 66 Resp: 20
   Temp:  02 Sat: 96 ECG Mon:
 
4. Pain Intensity: 4-SITTING; 9-WALKING
 
5. Fall Risk:
   Dizziness: N  Needs help standing or walking: N
   Fallen in the last 3 months: N
   Fall risk comments:
WHEELCHAIR
 
6. Patient on Blood Thinner: None
 
7. History of Hypertension: Y
 
8. Opioid Therapy greater than 6 weeks: Y
   Opiate Contract Signed:
 
9. Risk Assessment Tool Provided: LOW RISK 1/3
 
10. Functional Assessment Tool: 62/70
 
11. Recreational Drug Use: Never Drug Type:
    Tobacco Use: Never Smoker Tobacco Type:
       Amount or Packs/day:  How Many Years:
    Alcohol Use: No  Frequency:  Quant: suprapubic suprapubic/left costovertebral angle/right costovertebral angle